# Patient Record
Sex: FEMALE | Race: WHITE | NOT HISPANIC OR LATINO | Employment: FULL TIME | URBAN - NONMETROPOLITAN AREA
[De-identification: names, ages, dates, MRNs, and addresses within clinical notes are randomized per-mention and may not be internally consistent; named-entity substitution may affect disease eponyms.]

---

## 2020-12-21 ENCOUNTER — IMMUNIZATION (OUTPATIENT)
Dept: VACCINE CLINIC | Facility: HOSPITAL | Age: 40
End: 2020-12-21

## 2020-12-21 PROCEDURE — 91300 HC SARSCOV02 VAC 30MCG/0.3ML IM: CPT | Performed by: FAMILY MEDICINE

## 2020-12-21 PROCEDURE — 0001A: CPT | Performed by: FAMILY MEDICINE

## 2021-01-11 ENCOUNTER — IMMUNIZATION (OUTPATIENT)
Dept: VACCINE CLINIC | Facility: HOSPITAL | Age: 41
End: 2021-01-11

## 2021-01-11 PROCEDURE — 0001A: CPT | Performed by: FAMILY MEDICINE

## 2021-01-11 PROCEDURE — 0002A: CPT | Performed by: FAMILY MEDICINE

## 2021-01-11 PROCEDURE — 91300 HC SARSCOV02 VAC 30MCG/0.3ML IM: CPT | Performed by: FAMILY MEDICINE

## 2023-04-30 ENCOUNTER — PREP FOR SURGERY (OUTPATIENT)
Dept: OTHER | Facility: HOSPITAL | Age: 43
End: 2023-04-30
Payer: COMMERCIAL

## 2023-04-30 DIAGNOSIS — N93.9 ABNORMAL UTERINE BLEEDING (AUB): Primary | ICD-10-CM

## 2023-04-30 RX ORDER — SODIUM CHLORIDE 0.9 % (FLUSH) 0.9 %
10 SYRINGE (ML) INJECTION EVERY 12 HOURS SCHEDULED
OUTPATIENT
Start: 2023-04-30

## 2023-04-30 RX ORDER — SODIUM CHLORIDE 9 MG/ML
40 INJECTION, SOLUTION INTRAVENOUS AS NEEDED
OUTPATIENT
Start: 2023-04-30

## 2023-04-30 RX ORDER — SODIUM CHLORIDE 0.9 % (FLUSH) 0.9 %
10 SYRINGE (ML) INJECTION AS NEEDED
OUTPATIENT
Start: 2023-04-30

## 2023-05-03 NOTE — DISCHARGE INSTRUCTIONS

## 2023-05-05 ENCOUNTER — PRE-ADMISSION TESTING (OUTPATIENT)
Dept: PREADMISSION TESTING | Facility: HOSPITAL | Age: 43
End: 2023-05-05
Payer: COMMERCIAL

## 2023-05-05 DIAGNOSIS — N93.9 ABNORMAL UTERINE BLEEDING (AUB): ICD-10-CM

## 2023-05-05 LAB
ABO GROUP BLD: NORMAL
ANION GAP SERPL CALCULATED.3IONS-SCNC: 14.3 MMOL/L (ref 5–15)
ANISOCYTOSIS BLD QL: NORMAL
BASOPHILS # BLD AUTO: 0.1 10*3/MM3 (ref 0–0.2)
BASOPHILS NFR BLD AUTO: 0.7 % (ref 0–1.5)
BLD GP AB SCN SERPL QL: NEGATIVE
BUN SERPL-MCNC: 10 MG/DL (ref 6–20)
BUN/CREAT SERPL: 12.7 (ref 7–25)
CALCIUM SPEC-SCNC: 9.3 MG/DL (ref 8.6–10.5)
CHLORIDE SERPL-SCNC: 101 MMOL/L (ref 98–107)
CO2 SERPL-SCNC: 22.7 MMOL/L (ref 22–29)
CREAT SERPL-MCNC: 0.79 MG/DL (ref 0.57–1)
DEPRECATED RDW RBC AUTO: 50.6 FL (ref 37–54)
EGFRCR SERPLBLD CKD-EPI 2021: 94.7 ML/MIN/1.73
EOSINOPHIL # BLD AUTO: 0.7 10*3/MM3 (ref 0–0.4)
EOSINOPHIL NFR BLD AUTO: 4.9 % (ref 0.3–6.2)
ERYTHROCYTE [DISTWIDTH] IN BLOOD BY AUTOMATED COUNT: 15.5 % (ref 12.3–15.4)
GLUCOSE SERPL-MCNC: 92 MG/DL (ref 65–99)
HCT VFR BLD AUTO: 46.4 % (ref 34–46.6)
HGB BLD-MCNC: 14.6 G/DL (ref 12–15.9)
IMM GRANULOCYTES # BLD AUTO: 0.04 10*3/MM3 (ref 0–0.05)
IMM GRANULOCYTES NFR BLD AUTO: 0.3 % (ref 0–0.5)
LYMPHOCYTES # BLD AUTO: 3.28 10*3/MM3 (ref 0.7–3.1)
LYMPHOCYTES NFR BLD AUTO: 23.1 % (ref 19.6–45.3)
MCH RBC QN AUTO: 28.6 PG (ref 26.6–33)
MCHC RBC AUTO-ENTMCNC: 31.5 G/DL (ref 31.5–35.7)
MCV RBC AUTO: 91 FL (ref 79–97)
MONOCYTES # BLD AUTO: 0.99 10*3/MM3 (ref 0.1–0.9)
MONOCYTES NFR BLD AUTO: 7 % (ref 5–12)
NEUTROPHILS NFR BLD AUTO: 64 % (ref 42.7–76)
NEUTROPHILS NFR BLD AUTO: 9.07 10*3/MM3 (ref 1.7–7)
NRBC BLD AUTO-RTO: 0 /100 WBC (ref 0–0.2)
PLAT MORPH BLD: NORMAL
PLATELET # BLD AUTO: 368 10*3/MM3 (ref 140–450)
PMV BLD AUTO: 10.6 FL (ref 6–12)
POTASSIUM SERPL-SCNC: 3.7 MMOL/L (ref 3.5–5.2)
RBC # BLD AUTO: 5.1 10*6/MM3 (ref 3.77–5.28)
RH BLD: NEGATIVE
SODIUM SERPL-SCNC: 138 MMOL/L (ref 136–145)
T&S EXPIRATION DATE: NORMAL
WBC NRBC COR # BLD: 14.18 10*3/MM3 (ref 3.4–10.8)

## 2023-05-05 PROCEDURE — 80048 BASIC METABOLIC PNL TOTAL CA: CPT

## 2023-05-05 PROCEDURE — 36415 COLL VENOUS BLD VENIPUNCTURE: CPT

## 2023-05-05 PROCEDURE — 85025 COMPLETE CBC W/AUTO DIFF WBC: CPT

## 2023-05-05 PROCEDURE — 86850 RBC ANTIBODY SCREEN: CPT

## 2023-05-05 PROCEDURE — 93005 ELECTROCARDIOGRAM TRACING: CPT

## 2023-05-05 PROCEDURE — 86901 BLOOD TYPING SEROLOGIC RH(D): CPT

## 2023-05-05 PROCEDURE — 86900 BLOOD TYPING SEROLOGIC ABO: CPT

## 2023-05-05 PROCEDURE — 85007 BL SMEAR W/DIFF WBC COUNT: CPT

## 2023-05-05 RX ORDER — CLINDAMYCIN HYDROCHLORIDE 300 MG/1
300 CAPSULE ORAL 2 TIMES DAILY
Status: ON HOLD | COMMUNITY
End: 2023-05-08

## 2023-05-05 RX ORDER — LOSARTAN POTASSIUM AND HYDROCHLOROTHIAZIDE 12.5; 5 MG/1; MG/1
1 TABLET ORAL DAILY
COMMUNITY

## 2023-05-06 LAB
QT INTERVAL: 384 MS
QTC INTERVAL: 474 MS

## 2023-05-07 NOTE — H&P
This 43 year old patient presents for MA Notes, Provider HPI and Provider HPI.      History of Present Illness:  1.  MA Notes   Pt wants to discuss D&C      2.  Provider HPI   3.  Provider HPI   Pt presents for to discuss EMB results.     Pt with longstanding hx of irregular cycles.     TVUS was done 3/17/23: uterine length 9.46cm, EMS 0.96cm, simple appearing right ovarian cyst measuring 2.7cm in maximum dimension.   She had EMB done that day with complex hyperplasia without atypia. D&C recc for further evaluation and then recc mirena IUD placement for ongoing management of AUB and treatment of hyperplasia.     LMP was last week and lasted 2 days.     Repeat US today: uterus measuring 10cm. EMS 0.68cm. Bilateral ovaries normal appearing. Likely paratubal cyst measuring up to 2.5cm adjacent to right ovary with 2x smaller likely paratubal cysts.      - SVDx2.   She is s/p Essure for contraception. Denies pain.   She denies history of STDs. Denies hx abnormal pap smear. Last pap smear was 10/2022 NILM/HPV neg.   MMG up to date.                Screening Tools  Other Screenings  Encounter Date Performed Date Screening Tool Score Severity/ Interpretation MDD Classification Scanned Document   2023 Patient Health Questionnaire (PHQ-2) 0 Negative         Gynecologic History  2023 08:20 AM    Obstetric History  Not currently pregnant.   Past Systemic History    Medical History  (Reviewed,updated)  Disease Onset Date Comments   Hypertension     Iron Deficiency Anemia         Surgical History/Management  (Reviewed,updated)  Management Date Comments   Essure     left kidney removed       Diagnostics History  Status Study Ordered Completed Interpretation  Result / Report   completed MAMMO SCREENING BI, (2 VIEWS) INCL CAD  2022      completed TRANSVAGINAL US, NON-OB 2023 See module     completed TRANSVAGINAL US, NON-OB 2023          Problem List  Problem List  reviewed.       Medications (active prior to today)  Medication Name Sig Description Start Date Stop Date Refilled Rx Elsewhere   losartan 50 mg-hydrochlorothiazide 12.5 mg tablet take 1 tablet by oral route  every day 10/21/2022   N   iron 325 mg (65 mg iron) tablet take 1 tablet by oral route 2 times every day 10/21/2022   N   Victoza 2-Evin 0.6 mg/0.1 mL (18 mg/3 mL) subcutaneous pen injector inject (1.2MG)  by subcutaneous route  every day //   Y     Patient Status   Completed with information received for patient transitioning into care.   Completed with information received for patient in a summary of care record.     Medication Reconciliation  Medications reconciled today.      Allergies  Ingredient Reaction (Severity) Medication Name Comment   PENICILLINS Hives           Family History    (Reviewed, updated)  Relationship Family Member Name  Age at Death Condition Onset Age Cause of Death   Sister    Cancer, uterine  N   M    Social History  (Reviewed, updated)  Tobacco use reviewed.    Preferred language is English.      Marital Status/Family/Social Support  Marital status:      Tobacco use status: Heavy cigarette smoker (20-39 cigs/day).    Smoking status: Heavy tobacco smoker.    Tobacco Screening  Patient has used tobacco. Patient has used tobacco in the last 30 days. Patient has not used smokeless tobacco in the last 30 days.    Smoking Status  Type Smoking Status Usage Per Day Years Used Pack Years Total Pack Years   Cigarette Heavy tobacco smoker 1 Packs        Tobacco Cessation Information  Date Counseled By Order Status Description Code Tobacco Cessation Information   10/21/2022 Antonette Claros Tobacco cessation counseling completed   Smoking cessation education   2023 Milly Trinidad Tobacco cessation counseling completed   Smoking cessation education       Vaping Use  Screened for vaping? Yes  Status: Not a current user    Tobacco/Vaping Exposure  No passive vaping  exposure.  There is passive smoke exposure.    The patient has not had exposure to second hand smoke in the home environment.  Comments: Exposure    Alcohol  There is no history of alcohol use.     Caffeine  The patient uses caffeine: soda and energy drinks. - 24 oz a day.    Lifestyle  Diet  healthy.    Review of Systems  Review of Systems  System Neg/Pos Details   Constitutional Negative Chills, Fatigue, Fever, Weight gain and Weight loss.   Respiratory Negative Cough, Dyspnea and Wheezing.   Cardio Negative Chest pain and Irregular heartbeat/palpitations.   GI Negative Abdominal pain, Blood in stool, Constipation, Diarrhea, Nausea and Vomiting.    Negative Dysuria, Hematuria, Urinary incontinence and Urinary retention.   Endocrine Negative Cold intolerance and Heat intolerance.   Neuro Negative Dizziness and Headache.   Psych Negative Anxiety and Depression.   Integumentary Negative Hair loss, Rash and Skin lesion.   MS Negative Joint pain, Joint swelling and Muscle weakness.   Hema/Lymph Negative Easy bleeding and Easy bruising.   Reproductive Positive Dysmenorrhea, Hot flashes, Irregular menses.   Reproductive Negative Breast discharge, Breast lumps, Dyspareunia, History of abnormal PAP smear and Vaginal discharge.     Vital Signs     Time BP mm/Hg Pulse /min Resp /min Temp F Ht ft Ht in Ht cm Wt lb Wt kg BMI kg/m2 BSA m2 O2 Sat%    8:24 /85 79 18 97.5 5 7 170.18 376.8 170.914 59.01 2.84 98     Measured By  Time Measured by    8:24 AM Laurie Bocanegra       Screening Summary  The following were reviewed: nutrition, tobacco use, alcohol use, caffeine use and drugs of abuse        Physical Exam  Exam Findings Details   Constitutional Normal Well developed.   Respiratory Normal Inspection - Normal.   Genitourinary * Pelvic deferred.   Skin Normal Inspection - Normal.   Psychiatric Normal Appropriate mood and affect.       Completed Orders (This Visit)  Order Details Reason Side Interpretation Result  Additional Info Initial Treatment Date Region   Patient Health Questionnaire (PHQ-2)    Negative 0      Pre-Visit Planning           Prescribed activity/exercise education           Dietary management education, guidance, and counseling           TRANSVAGINAL US, NON-OB             Assessment/Plan  # Detail Type Description    1. Assessment Abnormal uterine bleeding (AUB) (N93.9).    Impression Reviewed EMB results and recc for HSC/D&C for further evaluation. Risks/benefits of surgery reviewed. Procedure scheduled for 5/8/23. Discussed mirena IUD placement in OR vs. clinic. Due to cost concerns, will plan for placement in office at 4week post-op visit..    Plan Orders She will be scheduled for HYSTEROSCOPY, W/WO D&C, Next test date is on 05/08/2023. Clinical information/comments: The surgeon scheduled is Amanda Fuentes MD. Pt is aware that Pre-Op is 05/05/2023 at 1:00PM at [facility].         2. Assessment Hx of ovarian cyst (Z87.42).    Impression Discussed that cysts simple and stable appearing. Consider repeat US in 3 months..    Plan Orders The patient had the following order(s) completed today: TRANSVAGINAL US, NON-OB. Obtained on 04/19/2023.         3. Assessment Dietary counseling and surveillance (Z71.3).    Plan Orders Today's instructions / counseling include(s) Dietary management education, guidance, and counseling and Prescribed activity/exercise education .         4. Other Orders Orders not associated to today's assessments.    Plan Orders The patient had the following order(s) completed today: Patient Health Questionnaire (PHQ-2). Interpretation: Negative, Result details: 0.            Diagnostic History Entered Today  Performed Study Interpretation Result   04/19/2023 Pre-Visit Planning       Clinical Guidelines (Reviewed, updated)  Guidelines Status Due Action Last Addressed Comments   Depression screening completed 04/19/2024 Completed on 04/19/2023 04/19/2023    HPV completed 10/30/2027  Completed on 10/30/2022 10/30/2022    Mammogram completed 01/12/2029 Completed on 06/20/2022 06/20/2022    PAP completed 10/30/2025 Completed on 10/30/2022 10/30/2022    Pap/HPV testing completed 10/30/2027 Completed on 10/30/2022 10/30/2022    Pre-Visit Planning completed 04/26/2023 Completed on 04/19/2023 04/19/2023          Patient Education  # Patient Education   1. Learning About Dietary Guidelines       Medications (Added, Continued or Stopped today)  Start Date Medication Directions PRN Status PRN Reason Instruction Stop Date   10/21/2022 iron 325 mg (65 mg iron) tablet take 1 tablet by oral route 2 times every day N      10/21/2022 losartan 50 mg-hydrochlorothiazide 12.5 mg tablet take 1 tablet by oral route  every day N       Victoza 2-Evin 0.6 mg/0.1 mL (18 mg/3 mL) subcutaneous pen injector inject (1.2MG)  by subcutaneous route  every day N        Surgery Scheduled  Surgery Details #1:  The patient has a diagnosis of: Abnormal uterine bleeding (AUB), N93.9  She is scheduled for: HYSTEROSCOPY, W/WO D&C  The surgery is scheduled for 05/08/2023, to be performed by Amanda Fuentes MD  Time Needed:      Additional Details:  Patient's Last BMI: 59.01       Orders/Procedures/Instructions/Educations  Office Procedures/Services  HYSTEROSCOPY, W/WO D&C     Diagnostics/Procedures Completed This Encounter  TRANSVAGINAL US, NON-OB         Counseling / Educational Factors  Counseling / educational factors reviewed.

## 2023-05-08 ENCOUNTER — ANESTHESIA (OUTPATIENT)
Dept: PERIOP | Facility: HOSPITAL | Age: 43
End: 2023-05-08
Payer: COMMERCIAL

## 2023-05-08 ENCOUNTER — ANESTHESIA EVENT (OUTPATIENT)
Dept: PERIOP | Facility: HOSPITAL | Age: 43
End: 2023-05-08
Payer: COMMERCIAL

## 2023-05-08 ENCOUNTER — APPOINTMENT (OUTPATIENT)
Dept: GENERAL RADIOLOGY | Facility: HOSPITAL | Age: 43
End: 2023-05-08
Payer: COMMERCIAL

## 2023-05-08 ENCOUNTER — HOSPITAL ENCOUNTER (OUTPATIENT)
Facility: HOSPITAL | Age: 43
Setting detail: HOSPITAL OUTPATIENT SURGERY
Discharge: HOME | End: 2023-05-08
Attending: OBSTETRICS & GYNECOLOGY | Admitting: OBSTETRICS & GYNECOLOGY
Payer: COMMERCIAL

## 2023-05-08 VITALS
HEIGHT: 67 IN | OXYGEN SATURATION: 94 % | RESPIRATION RATE: 18 BRPM | HEART RATE: 82 BPM | BODY MASS INDEX: 45.99 KG/M2 | WEIGHT: 293 LBS | SYSTOLIC BLOOD PRESSURE: 122 MMHG | DIASTOLIC BLOOD PRESSURE: 90 MMHG | TEMPERATURE: 97.8 F

## 2023-05-08 DIAGNOSIS — Z01.89 LABORATORY TEST: Primary | ICD-10-CM

## 2023-05-08 DIAGNOSIS — Z87.42 HISTORY OF OVARIAN CYST: ICD-10-CM

## 2023-05-08 DIAGNOSIS — N93.9 ABNORMAL UTERINE BLEEDING (AUB): ICD-10-CM

## 2023-05-08 LAB
ABO GROUP BLD: NORMAL
HCG SERPL QL: NEGATIVE
RH BLD: NEGATIVE

## 2023-05-08 PROCEDURE — C1782 MORCELLATOR: HCPCS | Performed by: OBSTETRICS & GYNECOLOGY

## 2023-05-08 PROCEDURE — 25010000002 ONDANSETRON PER 1 MG: Performed by: NURSE ANESTHETIST, CERTIFIED REGISTERED

## 2023-05-08 PROCEDURE — 25010000002 PROPOFOL 200 MG/20ML EMULSION: Performed by: NURSE ANESTHETIST, CERTIFIED REGISTERED

## 2023-05-08 PROCEDURE — 86901 BLOOD TYPING SEROLOGIC RH(D): CPT

## 2023-05-08 PROCEDURE — C1889 IMPLANT/INSERT DEVICE, NOC: HCPCS | Performed by: OBSTETRICS & GYNECOLOGY

## 2023-05-08 PROCEDURE — 86900 BLOOD TYPING SEROLOGIC ABO: CPT

## 2023-05-08 PROCEDURE — 84703 CHORIONIC GONADOTROPIN ASSAY: CPT | Performed by: OBSTETRICS & GYNECOLOGY

## 2023-05-08 PROCEDURE — 25010000002 FENTANYL CITRATE (PF) 50 MCG/ML SOLUTION: Performed by: NURSE ANESTHETIST, CERTIFIED REGISTERED

## 2023-05-08 PROCEDURE — 25010000002 MIDAZOLAM PER 1 MG: Performed by: NURSE ANESTHETIST, CERTIFIED REGISTERED

## 2023-05-08 DEVICE — IUD LEVONORGESTREL MIRENA 52MG: Type: IMPLANTABLE DEVICE | Site: CERVIX | Status: FUNCTIONAL

## 2023-05-08 RX ORDER — KETOROLAC TROMETHAMINE 30 MG/ML
30 INJECTION, SOLUTION INTRAMUSCULAR; INTRAVENOUS EVERY 6 HOURS PRN
Status: DISCONTINUED | OUTPATIENT
Start: 2023-05-08 | End: 2023-05-08 | Stop reason: HOSPADM

## 2023-05-08 RX ORDER — LIDOCAINE HYDROCHLORIDE AND EPINEPHRINE BITARTRATE 20; .01 MG/ML; MG/ML
INJECTION, SOLUTION SUBCUTANEOUS AS NEEDED
Status: DISCONTINUED | OUTPATIENT
Start: 2023-05-08 | End: 2023-05-08 | Stop reason: HOSPADM

## 2023-05-08 RX ORDER — MAGNESIUM HYDROXIDE 1200 MG/15ML
LIQUID ORAL AS NEEDED
Status: DISCONTINUED | OUTPATIENT
Start: 2023-05-08 | End: 2023-05-08 | Stop reason: HOSPADM

## 2023-05-08 RX ORDER — MIDAZOLAM HYDROCHLORIDE 1 MG/ML
INJECTION INTRAMUSCULAR; INTRAVENOUS AS NEEDED
Status: DISCONTINUED | OUTPATIENT
Start: 2023-05-08 | End: 2023-05-08 | Stop reason: SURG

## 2023-05-08 RX ORDER — PROPOFOL 10 MG/ML
INJECTION, EMULSION INTRAVENOUS AS NEEDED
Status: DISCONTINUED | OUTPATIENT
Start: 2023-05-08 | End: 2023-05-08 | Stop reason: SURG

## 2023-05-08 RX ORDER — ONDANSETRON 2 MG/ML
4 INJECTION INTRAMUSCULAR; INTRAVENOUS AS NEEDED
Status: DISCONTINUED | OUTPATIENT
Start: 2023-05-08 | End: 2023-05-08 | Stop reason: HOSPADM

## 2023-05-08 RX ORDER — SODIUM CHLORIDE 0.9 % (FLUSH) 0.9 %
10 SYRINGE (ML) INJECTION AS NEEDED
Status: DISCONTINUED | OUTPATIENT
Start: 2023-05-08 | End: 2023-05-08 | Stop reason: HOSPADM

## 2023-05-08 RX ORDER — FAMOTIDINE 10 MG/ML
INJECTION, SOLUTION INTRAVENOUS AS NEEDED
Status: DISCONTINUED | OUTPATIENT
Start: 2023-05-08 | End: 2023-05-08 | Stop reason: SURG

## 2023-05-08 RX ORDER — SODIUM CHLORIDE 9 MG/ML
40 INJECTION, SOLUTION INTRAVENOUS AS NEEDED
Status: DISCONTINUED | OUTPATIENT
Start: 2023-05-08 | End: 2023-05-08 | Stop reason: HOSPADM

## 2023-05-08 RX ORDER — MIDAZOLAM HYDROCHLORIDE 1 MG/ML
1 INJECTION INTRAMUSCULAR; INTRAVENOUS
Status: DISCONTINUED | OUTPATIENT
Start: 2023-05-08 | End: 2023-05-08 | Stop reason: HOSPADM

## 2023-05-08 RX ORDER — FENTANYL CITRATE 50 UG/ML
INJECTION, SOLUTION INTRAMUSCULAR; INTRAVENOUS AS NEEDED
Status: DISCONTINUED | OUTPATIENT
Start: 2023-05-08 | End: 2023-05-08 | Stop reason: SURG

## 2023-05-08 RX ORDER — ONDANSETRON 2 MG/ML
INJECTION INTRAMUSCULAR; INTRAVENOUS AS NEEDED
Status: DISCONTINUED | OUTPATIENT
Start: 2023-05-08 | End: 2023-05-08 | Stop reason: SURG

## 2023-05-08 RX ORDER — IPRATROPIUM BROMIDE AND ALBUTEROL SULFATE 2.5; .5 MG/3ML; MG/3ML
3 SOLUTION RESPIRATORY (INHALATION) ONCE AS NEEDED
Status: DISCONTINUED | OUTPATIENT
Start: 2023-05-08 | End: 2023-05-08 | Stop reason: HOSPADM

## 2023-05-08 RX ORDER — SODIUM CHLORIDE, SODIUM LACTATE, POTASSIUM CHLORIDE, CALCIUM CHLORIDE 600; 310; 30; 20 MG/100ML; MG/100ML; MG/100ML; MG/100ML
125 INJECTION, SOLUTION INTRAVENOUS ONCE
Status: COMPLETED | OUTPATIENT
Start: 2023-05-08 | End: 2023-05-08

## 2023-05-08 RX ORDER — MEPERIDINE HYDROCHLORIDE 25 MG/ML
12.5 INJECTION INTRAMUSCULAR; INTRAVENOUS; SUBCUTANEOUS
Status: DISCONTINUED | OUTPATIENT
Start: 2023-05-08 | End: 2023-05-08 | Stop reason: HOSPADM

## 2023-05-08 RX ORDER — SODIUM CHLORIDE 0.9 % (FLUSH) 0.9 %
10 SYRINGE (ML) INJECTION EVERY 12 HOURS SCHEDULED
Status: DISCONTINUED | OUTPATIENT
Start: 2023-05-08 | End: 2023-05-08 | Stop reason: HOSPADM

## 2023-05-08 RX ORDER — OXYCODONE HYDROCHLORIDE AND ACETAMINOPHEN 5; 325 MG/1; MG/1
1 TABLET ORAL ONCE AS NEEDED
Status: DISCONTINUED | OUTPATIENT
Start: 2023-05-08 | End: 2023-05-08 | Stop reason: HOSPADM

## 2023-05-08 RX ORDER — SODIUM CHLORIDE, SODIUM LACTATE, POTASSIUM CHLORIDE, CALCIUM CHLORIDE 600; 310; 30; 20 MG/100ML; MG/100ML; MG/100ML; MG/100ML
INJECTION, SOLUTION INTRAVENOUS CONTINUOUS PRN
Status: DISCONTINUED | OUTPATIENT
Start: 2023-05-08 | End: 2023-05-08 | Stop reason: SURG

## 2023-05-08 RX ORDER — IBUPROFEN 600 MG/1
600 TABLET ORAL EVERY 6 HOURS
Qty: 30 TABLET | Refills: 0 | Status: SHIPPED | OUTPATIENT
Start: 2023-05-08

## 2023-05-08 RX ORDER — FENTANYL CITRATE 50 UG/ML
50 INJECTION, SOLUTION INTRAMUSCULAR; INTRAVENOUS
Status: DISCONTINUED | OUTPATIENT
Start: 2023-05-08 | End: 2023-05-08 | Stop reason: HOSPADM

## 2023-05-08 RX ORDER — LIDOCAINE HYDROCHLORIDE 20 MG/ML
INJECTION, SOLUTION EPIDURAL; INFILTRATION; INTRACAUDAL; PERINEURAL AS NEEDED
Status: DISCONTINUED | OUTPATIENT
Start: 2023-05-08 | End: 2023-05-08 | Stop reason: SURG

## 2023-05-08 RX ORDER — SODIUM CHLORIDE, SODIUM LACTATE, POTASSIUM CHLORIDE, CALCIUM CHLORIDE 600; 310; 30; 20 MG/100ML; MG/100ML; MG/100ML; MG/100ML
100 INJECTION, SOLUTION INTRAVENOUS ONCE AS NEEDED
Status: DISCONTINUED | OUTPATIENT
Start: 2023-05-08 | End: 2023-05-08 | Stop reason: HOSPADM

## 2023-05-08 RX ADMIN — LIDOCAINE HYDROCHLORIDE 60 MG: 20 INJECTION, SOLUTION EPIDURAL; INFILTRATION; INTRACAUDAL; PERINEURAL at 07:54

## 2023-05-08 RX ADMIN — ONDANSETRON 4 MG: 2 INJECTION INTRAMUSCULAR; INTRAVENOUS at 08:04

## 2023-05-08 RX ADMIN — FAMOTIDINE 20 MG: 10 INJECTION, SOLUTION INTRAVENOUS at 07:52

## 2023-05-08 RX ADMIN — SODIUM CHLORIDE, POTASSIUM CHLORIDE, SODIUM LACTATE AND CALCIUM CHLORIDE: 600; 310; 30; 20 INJECTION, SOLUTION INTRAVENOUS at 07:51

## 2023-05-08 RX ADMIN — SODIUM CHLORIDE, POTASSIUM CHLORIDE, SODIUM LACTATE AND CALCIUM CHLORIDE 125 ML/HR: 600; 310; 30; 20 INJECTION, SOLUTION INTRAVENOUS at 07:08

## 2023-05-08 RX ADMIN — MIDAZOLAM HYDROCHLORIDE 2 MG: 1 INJECTION, SOLUTION INTRAMUSCULAR; INTRAVENOUS at 07:52

## 2023-05-08 RX ADMIN — FENTANYL CITRATE 100 MCG: 50 INJECTION INTRAMUSCULAR; INTRAVENOUS at 07:52

## 2023-05-08 RX ADMIN — PROPOFOL 200 MG: 10 INJECTION, EMULSION INTRAVENOUS at 07:54

## 2023-05-08 NOTE — OP NOTE
DILATATION AND CURETTAGE HYSTEROSCOPY  Procedure Note    Karmen Chatterjee  5/8/2023    Pre-op Diagnosis:   Abnormal uterine bleeding  Complex hyperplasia without atypia     Post-op Diagnosis:     Abnormal uterine bleeding  Complex hyperplasia without atypia   Endometrial polyps    Procedure(s):  DILATATION AND CURETTAGE HYSTEROSCOPY  IUD INSERTION with myosure    Surgeon(s):  Amanda Fuentes MD    Anesthesia: General    Estimated Blood Loss: 10 mL    Specimens:  Order Name Source Comment Collection Info Order Time   HCG, SERUM, QUALITATIVE   Collected By: Letty Jeffers 5/8/2023  6:54 AM     Release to patient   Routine Release        ABO/RH SPECIMEN VERIFICATION PREOP   Collected By: Letty Jeffers 5/8/2023  6:41 AM     Release to patient   Routine Release        TISSUE EXAM, [facility]) Endometrial Curettings  Collected By: Amanda Fuentes MD 5/8/2023  8:17 AM     How many specimens?   1          Release to patient   Routine Release              Procedure:   The patient was taken to the operating room after informed written consent was obtained.  A timeout procedure was performed. The patient was placed under general anesthesia and then prepared and draped in the dorsal lithotomy position under sterile conditions.  We placed a speculum into the vagina. We grasped the anterior lip of the cervix with a toothed tenaculum. The cervix was dilated to accomodate the hysteroscope. The hysteroscope was inserted.Uterine survey revealed very thickened endometrium. Both tubal ostia were visualized. We then removed the hysteroscope. We then did a sharp curettage in all quadrants of the uterus until a fine gritty texture was noted. The hysteroscope was replaced and multiple small endometrial polyps were visualized both anteriorly and posteriorly at uterine fundus. Myosure device was placed and used to remove the endometrial polyps. Sharp curettage was then performed again.  The endometrial  curettings were sent to pathology as a specimen. Uterine cavity was sounded to 9.5cm. Mirena IUD was placed per manufacturers guidelines and strings trimmed.There were no complications with the procedure and all the counts were correct.     Findings: Very thickened endometrium with multiple small polyps noted anteriorly and posteriorly at uterine fundus.    Complications: none    Grafts or Implants: Mirena IUD    Amanda Fuentes MD     Date: 5/8/2023  Time: 08:54 EDT

## 2023-05-08 NOTE — ANESTHESIA PROCEDURE NOTES
Airway  Urgency: elective    Date/Time: 5/8/2023 7:54 AM  Airway not difficult    General Information and Staff    Patient location during procedure: OR  Anesthesiologist: David Bautista MD  CRNA/CAA: Angela Valente CRNA    Indications and Patient Condition  Indications for airway management: airway protection    Preoxygenated: yes  Mask difficulty assessment: 0 - not attempted    Final Airway Details  Final airway type: supraglottic airway      Successful airway: classic  Size 4     Number of attempts at approach: 1  Assessment: lips, teeth, and gum same as pre-op    Additional Comments  LMA placed with no trauma noted. Patient tolerated well. Good seal. Secured.

## 2023-05-08 NOTE — ANESTHESIA POSTPROCEDURE EVALUATION
Patient: Karmen Chatterjee    Procedure Summary     Date: 05/08/23 Room / Location: [facility] OR 01 /  [facility] OR    Anesthesia Start: 0751 Anesthesia Stop: 0853    Procedure: DILATATION AND CURETTAGE HYSTEROSCOPY  IUD INSERTION with myosure (Vagina) Diagnosis: (N93.9)    Surgeons: Amanda Fuentes MD Provider: David Bautista MD    Anesthesia Type: general ASA Status: 3          Anesthesia Type: general    Vitals  Vitals Value Taken Time   /92 05/08/23 0923   Temp 97 °F (36.1 °C) 05/08/23 0853   Pulse 83 05/08/23 0923   Resp 18 05/08/23 0923   SpO2 94 % 05/08/23 0923           Post Anesthesia Care and Evaluation    Patient location during evaluation: PHASE II  Patient participation: complete - patient participated  Level of consciousness: awake and alert  Pain score: 0  Pain management: adequate    Airway patency: patent  Anesthetic complications: No anesthetic complications    Cardiovascular status: acceptable  Respiratory status: acceptable  Hydration status: acceptable

## 2023-05-08 NOTE — INTERVAL H&P NOTE
H&P reviewed. The patient was examined and there are no changes to the H&P.    Pt desires mirena IUD insertion at time of procedure - risks/benefits of IUD insertion reviewed.     Amanda Fuentes MD

## 2023-05-08 NOTE — ANESTHESIA PREPROCEDURE EVALUATION
Anesthesia Evaluation     Patient summary reviewed and Nursing notes reviewed   no history of anesthetic complications:  NPO Solid Status: > 8 hours  NPO Liquid Status: > 8 hours           Airway   Mallampati: II  TM distance: >3 FB  Neck ROM: full  No difficulty expected  Dental - normal exam     Pulmonary - normal exam    breath sounds clear to auscultation  (+) a smoker Current Abstained day of surgery,   Cardiovascular - normal exam    ECG reviewed  Rhythm: regular  Rate: normal    (+) hypertension,       Neuro/Psych- negative ROS  GI/Hepatic/Renal/Endo    (+) obesity, morbid obesity,      Musculoskeletal (-) negative ROS    Abdominal   (+) obese,    Substance History - negative use     OB/GYN negative ob/gyn ROS         Other - negative ROS                       Anesthesia Plan    ASA 3     general     intravenous induction     Anesthetic plan, risks, benefits, and alternatives have been provided, discussed and informed consent has been obtained with: patient.    Use of blood products discussed with patient  Consented to blood products.       CODE STATUS:

## 2023-05-09 LAB — REF LAB TEST METHOD: NORMAL

## (undated) DEVICE — SYR LL TP 10ML STRL

## (undated) DEVICE — DRSNG TELFA PAD NONADH STR 1S 3X4IN

## (undated) DEVICE — NEEDLE, QUINCKE 22GX3.5": Brand: MEDLINE INDUSTRIES, INC.

## (undated) DEVICE — COR MINOR LITHOTOMY: Brand: MEDLINE INDUSTRIES, INC.

## (undated) DEVICE — DEV TISS REMOV MYOSURE REACH

## (undated) DEVICE — GLV SURG PREMIERPRO MIC LTX PF SZ6 BRN

## (undated) DEVICE — CYSTO/BLADDER IRRIGATION SET, REGULATING CLAMP

## (undated) DEVICE — NDL SPINE 22G 31/2IN BLK